# Patient Record
Sex: MALE | Race: BLACK OR AFRICAN AMERICAN | NOT HISPANIC OR LATINO | RURAL
[De-identification: names, ages, dates, MRNs, and addresses within clinical notes are randomized per-mention and may not be internally consistent; named-entity substitution may affect disease eponyms.]

---

## 2018-10-18 ENCOUNTER — OFFICE VISIT (OUTPATIENT)
Dept: FAMILY MEDICINE CLINIC | Facility: CLINIC | Age: 28
End: 2018-10-18

## 2018-10-18 VITALS
HEIGHT: 70 IN | HEART RATE: 75 BPM | OXYGEN SATURATION: 98 % | WEIGHT: 210 LBS | BODY MASS INDEX: 30.06 KG/M2 | TEMPERATURE: 98.1 F

## 2018-10-18 DIAGNOSIS — K08.89 PAIN, DENTAL: Primary | ICD-10-CM

## 2018-10-18 DIAGNOSIS — K04.7 DENTAL ABSCESS: ICD-10-CM

## 2018-10-18 PROCEDURE — 99202 OFFICE O/P NEW SF 15 MIN: CPT | Performed by: NURSE PRACTITIONER

## 2018-10-18 RX ORDER — CHLORHEXIDINE GLUCONATE 0.12 MG/ML
15 RINSE ORAL EVERY 12 HOURS SCHEDULED
Qty: 480 ML | Refills: 0 | Status: SHIPPED | OUTPATIENT
Start: 2018-10-18 | End: 2019-07-10

## 2018-10-18 RX ORDER — HYDROCODONE BITARTRATE AND ACETAMINOPHEN 5; 325 MG/1; MG/1
1 TABLET ORAL EVERY 6 HOURS PRN
Qty: 5 TABLET | Refills: 0 | Status: SHIPPED | OUTPATIENT
Start: 2018-10-18 | End: 2018-10-18 | Stop reason: SDUPTHER

## 2018-10-18 RX ORDER — IBUPROFEN 800 MG/1
800 TABLET ORAL EVERY 8 HOURS PRN
Qty: 30 TABLET | Refills: 1 | Status: SHIPPED | OUTPATIENT
Start: 2018-10-18 | End: 2019-07-10

## 2018-10-18 RX ORDER — HYDROCODONE BITARTRATE AND ACETAMINOPHEN 5; 325 MG/1; MG/1
1 TABLET ORAL EVERY 6 HOURS PRN
Qty: 5 TABLET | Refills: 0 | Status: SHIPPED | OUTPATIENT
Start: 2018-10-18 | End: 2019-07-10

## 2018-10-18 RX ORDER — PENICILLIN V POTASSIUM 500 MG/1
500 TABLET ORAL 4 TIMES DAILY
Qty: 40 TABLET | Refills: 0 | Status: SHIPPED | OUTPATIENT
Start: 2018-10-18 | End: 2019-07-10

## 2018-10-18 NOTE — PATIENT INSTRUCTIONS
Dental Abscess  A dental abscess is a collection of pus in or around a tooth.  What are the causes?  This condition is caused by a bacterial infection around the root of the tooth that involves the inner part of the tooth (pulp). It may result from:  · Severe tooth decay.  · Trauma to the tooth that allows bacteria to enter into the pulp, such as a broken or chipped tooth.  · Severe gum disease around a tooth.    What are the signs or symptoms?  Symptoms of this condition include:  · Severe pain in and around the infected tooth.  · Swelling and redness around the infected tooth, in the mouth, or in the face.  · Tenderness.  · Pus drainage.  · Bad breath.  · Bitter taste in the mouth.  · Difficulty swallowing.  · Difficulty opening the mouth.  · Nausea.  · Vomiting.  · Chills.  · Swollen neck glands.  · Fever.    How is this diagnosed?  This condition is diagnosed with examination of the infected tooth. During the exam, your dentist may tap on the infected tooth. Your dentist will also ask about your medical and dental history and may order X-rays.  How is this treated?  This condition is treated by eliminating the infection. This may be done with:  · Antibiotic medicine.  · A root canal. This may be performed to save the tooth.  · Pulling (extracting) the tooth. This may also involve draining the abscess. This is done if the tooth cannot be saved.    Follow these instructions at home:  · Take medicines only as directed by your dentist.  · If you were prescribed antibiotic medicine, finish all of it even if you start to feel better.  · Rinse your mouth (gargle) often with salt water to relieve pain or swelling.  · Do not drive or operate heavy machinery while taking pain medicine.  · Do not apply heat to the outside of your mouth.  · Keep all follow-up visits as directed by your dentist. This is important.  Contact a health care provider if:  · Your pain is worse and is not helped by medicine.  Get help right away  if:  · You have a fever or chills.  · Your symptoms suddenly get worse.  · You have a very bad headache.  · You have problems breathing or swallowing.  · You have trouble opening your mouth.  · You have swelling in your neck or around your eye.  This information is not intended to replace advice given to you by your health care provider. Make sure you discuss any questions you have with your health care provider.  Document Released: 12/18/2006 Document Revised: 04/27/2017 Document Reviewed: 12/15/2015  [employer] Interactive Patient Education © 2018 [employer]

## 2018-10-18 NOTE — PROGRESS NOTES
"Julián Morales is a 28 y.o. male.     FP Walk in Clinic Visit    PCP: None listed    CC: \"bad toothache x 2 weeks\"    Has had problems with teeth for quite some time and needs several extracted.  Has appointment with oral surgeon, but not until December.  Last treated for abscess at [facility] about 2 months ago and felt well x 4-6 weeks and then it started back again.  Pain is mostly on right side with some swelling.  Difficult to eat/drink.  No fever.        Dental Pain    This is a recurrent problem. Episode onset: newest flare started about 2 weeks ago. The problem occurs constantly. The problem has been gradually worsening. The pain is at a severity of 8/10. Associated symptoms include facial pain and thermal sensitivity. Pertinent negatives include no difficulty swallowing, fever, oral bleeding or sinus pressure. He has tried NSAIDs and acetaminophen for the symptoms. The treatment provided no relief.        The following portions of the patient's history were reviewed and updated as appropriate: allergies, current medications, past medical history, past social history, past surgical history and problem list.    Review of Systems   Constitutional: Negative for appetite change, chills, fatigue and fever.   HENT: Positive for dental problem. Negative for sinus pressure.    Eyes: Negative for discharge and itching.   Respiratory: Negative for cough, chest tightness, shortness of breath and wheezing.    Cardiovascular: Negative for chest pain and leg swelling.   Gastrointestinal: Negative for diarrhea, nausea and vomiting.   Musculoskeletal: Negative for myalgias, neck pain and neck stiffness.   Skin: Negative for rash.   Neurological: Positive for headache. Negative for dizziness.   Hematological: Positive for adenopathy.       Objective    Pulse 75   Temp 98.1 °F (36.7 °C) (Tympanic)   Ht 177.8 cm (70\")   Wt 95.3 kg (210 lb)   SpO2 98%   BMI 30.13 kg/m²     Physical Exam   Constitutional: He is " oriented to person, place, and time. He appears well-developed and well-nourished. Distressed:  in some discomfort, rocking back/forth, holding right side of mouth.   HENT:   Head: Normocephalic and atraumatic.   Mouth/Throat: Mucous membranes are normal. Abnormal dentition. Dental abscesses ( right lower gingiva with erythema, edema and multiple teeth with decay broken down to gum line) and dental caries ( multiple) present. No oropharyngeal exudate or posterior oropharyngeal erythema.   Neck: Neck supple.   Cardiovascular: Normal rate and regular rhythm.    Pulmonary/Chest: Effort normal and breath sounds normal. He has no wheezes. He has no rales.   Lymphadenopathy:     He has cervical adenopathy ( shotty on right).   Neurological: He is alert and oriented to person, place, and time.   Nursing note and vitals reviewed.        Assessment/Plan   Csaimiro was seen today for dental pain.    Diagnoses and all orders for this visit:    Pain, dental  -     lidocaine viscous (XYLOCAINE) 2 % solution; Saturate cotton ball and apply over affected tooth/gum area every 3 hours prn pain  -     ibuprofen (ADVIL,MOTRIN) 800 MG tablet; Take 1 tablet by mouth Every 8 (Eight) Hours As Needed (dental pain).  -     Discontinue: HYDROcodone-acetaminophen (NORCO) 5-325 MG per tablet; Take 1 tablet by mouth Every 6 (Six) Hours As Needed (dental pain).  -     HYDROcodone-acetaminophen (NORCO) 5-325 MG per tablet; Take 1 tablet by mouth Every 6 (Six) Hours As Needed (dental pain).    Dental abscess  -     penicillin v potassium (VEETID) 500 MG tablet; Take 1 tablet by mouth 4 (Four) Times a Day.  -     chlorhexidine (PERIDEX) 0.12 % solution; Apply 15 mL to the mouth or throat Every 12 (Twelve) Hours.  -     lidocaine viscous (XYLOCAINE) 2 % solution; Saturate cotton ball and apply over affected tooth/gum area every 3 hours prn pain  -     ibuprofen (ADVIL,MOTRIN) 800 MG tablet; Take 1 tablet by mouth Every 8 (Eight) Hours As Needed  (dental pain).  -     Discontinue: HYDROcodone-acetaminophen (NORCO) 5-325 MG per tablet; Take 1 tablet by mouth Every 6 (Six) Hours As Needed (dental pain).  -     HYDROcodone-acetaminophen (NORCO) 5-325 MG per tablet; Take 1 tablet by mouth Every 6 (Six) Hours As Needed (dental pain).      Rx for Penicillin, Peridex, Lidocaine viscous, Motrin 800  Rx for Norco #5 only for the next 24 hours until antibiotics start working.  Jeremias #[id number] reviewed and no activity noted.     See oral surgeon as scheduled.  Try to call and check for cancellations to try and be seen sooner if possible.

## 2019-07-10 ENCOUNTER — OFFICE VISIT (OUTPATIENT)
Dept: FAMILY MEDICINE CLINIC | Facility: CLINIC | Age: 29
End: 2019-07-10

## 2019-07-10 VITALS
BODY MASS INDEX: 28.23 KG/M2 | SYSTOLIC BLOOD PRESSURE: 120 MMHG | HEART RATE: 70 BPM | DIASTOLIC BLOOD PRESSURE: 80 MMHG | WEIGHT: 197.2 LBS | HEIGHT: 70 IN | TEMPERATURE: 98.6 F | OXYGEN SATURATION: 99 %

## 2019-07-10 DIAGNOSIS — I83.813 VARICOSE VEINS OF BOTH LOWER EXTREMITIES WITH PAIN: Primary | ICD-10-CM

## 2019-07-10 PROCEDURE — 99213 OFFICE O/P EST LOW 20 MIN: CPT | Performed by: NURSE PRACTITIONER

## 2019-07-10 NOTE — PROGRESS NOTES
"Julián Morales is a 29 y.o. male.     FP Walk in Clinic Visit    PCP: none listed    CC: \"problems with my veins in leg\"      Lower Extremity Issue   This is a chronic (varicose veins) problem. Episode onset: several years. The problem occurs daily. The problem has been gradually worsening. Pertinent negatives include no abdominal pain, anorexia, arthralgias, change in bowel habit, chest pain, chills, congestion, coughing, diaphoresis, fatigue, fever, headaches, joint swelling, myalgias, nausea, neck pain, numbness, rash, sore throat, swollen glands, urinary symptoms, vertigo, visual change, vomiting or weakness. Associated symptoms comments: +aching and burning in legs  . 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 Treatments tried: seen years ago for same and had u/s and wore compression stockings, surgery recommended, but never followed up--unsure who he saw at that time. The treatment provided no relief.        The following portions of the patient's history were reviewed and updated as appropriate: allergies, current medications, past medical history, past social history, past surgical history and problem list.    Review of Systems   Constitutional: Negative for chills, diaphoresis, fatigue and fever.   HENT: Negative for congestion and sore throat.    Respiratory: Negative.  Negative for cough.    Cardiovascular: Positive for leg swelling ( mild). Negative for chest pain and palpitations.        +varicose veins     Gastrointestinal: Negative for abdominal pain, anorexia, change in bowel habit, diarrhea, nausea and vomiting.   Genitourinary: Negative for difficulty urinating.   Musculoskeletal: Negative for arthralgias, joint swelling, myalgias and neck pain.   Skin: Negative for rash.   Neurological: Negative for vertigo, weakness, numbness and headache.     /80   Pulse 70   Temp 98.6 °F (37 °C)   Ht 177.8 cm (70\")   Wt 89.4 kg (197 lb 3.2 oz)   " SpO2 99%   BMI 28.30 kg/m²     Objective   Physical Exam   Constitutional: He is oriented to person, place, and time. He appears well-developed and well-nourished. No distress.   Cardiovascular: Normal rate and regular rhythm.   Pulses:       Dorsalis pedis pulses are 2+ on the right side, and 2+ on the left side.   Multiple engorged varicosities to bilateral lower extremities, some in large clusters, Left>Right   Pulmonary/Chest: Effort normal and breath sounds normal. He has no wheezes. He has no rales.   Neurological: He is alert and oriented to person, place, and time.   Nursing note and vitals reviewed.    No results found for this or any previous visit (from the past 24 hour(s)).  No Images in the past 120 days found..      Assessment/Plan   Diagnoses and all orders for this visit:    Varicose veins of both lower extremities with pain  -     Ambulatory Referral to Cardiothoracic Surgery        Encouraged to resume use of compression stockings  Referral to vascular surgeon for evaluation

## 2019-07-10 NOTE — PATIENT INSTRUCTIONS
Varicose Veins  Varicose veins are veins that have become enlarged, bulged, and twisted. They most often appear in the legs.  What are the causes?  This condition is caused by damage to the valves in the vein. These valves help blood return to your heart. When they are damaged and they stop working properly, blood may flow backward and back up in the veins near the skin, causing the veins to get larger and appear twisted.  The condition can result from any issue that causes blood to back up, like pregnancy, prolonged standing, or obesity.  What increases the risk?  This condition is more likely to develop in people who are:  · On their feet a lot.  · Pregnant.  · Overweight.    What are the signs or symptoms?  Symptoms of this condition include:  · Bulging, twisted, and bluish veins.  · A feeling of heaviness. This may be worse at the end of the day.  · Leg pain. This may be worse at the end of the day.  · Swelling in the leg.  · Changes in skin color over the veins.    How is this diagnosed?  This condition may be diagnosed based on your symptoms, a physical exam, and an ultrasound test.  How is this treated?  Treatment for this condition may involve:  · Avoiding sitting or standing in one position for long periods of time.  · Wearing compression stockings. These stockings help to prevent blood clots and reduce swelling in the legs.  · Raising (elevating) the legs when resting.  · Losing weight.  · Exercising regularly.    If you have persistent symptoms or want to improve the way your varicose veins look, you may choose to have a procedure to close the varicose veins off or to remove them.  Treatments to close off the veins include:  · Sclerotherapy. In this treatment, a solution is injected into a vein to close it off.  · Laser treatment. In this treatment, the vein is heated with a laser to close it off.  · Radiofrequency vein ablation. In this treatment, an electrical current produced by radio waves is used to  close off the vein.    Treatments to remove the veins include:  · Phlebectomy. In this treatment, the veins are removed through small incisions made over the veins.  · Vein ligation and stripping. In this treatment, incisions are made over the veins. The veins are then removed after being tied (ligated) with stitches (sutures).    Follow these instructions at home:  Activity  · Walk as much as possible. Walking increases blood flow. This helps blood return to the heart and takes pressure off your veins. It also increases your cardiovascular strength.  · Follow your health care provider's instructions about exercising.  · Do not stand or sit in one position for a long period of time.  · Do not sit with your legs crossed.  · Rest with your legs raised during the day.  General instructions  · Follow any diet instructions given to you by your health care provider.  · Wear compression stockings as directed by your health care provider. Do not wear other kinds of tight clothing around your legs, pelvis, or waist.  · Elevate your legs at night to above the level of your heart.  · If you get a cut in the skin over the varicose vein and the vein bleeds:  ? Lie down with your leg raised.  ? Apply firm pressure to the cut with a clean cloth until the bleeding stops.  ? Place a bandage (dressing) on the cut.  Contact a health care provider if:  · The skin around your varicose veins starts to break down.  · You have pain, redness, tenderness, or hard swelling over a vein.  · You are uncomfortable because of pain.  · You get a cut in the skin over a varicose vein and it will not stop bleeding.  Summary  · Varicose veins are veins that have become enlarged, bulged, and twisted. They most often appear in the legs.  · This condition is caused by damage to the valves in the vein. These valves help blood return to your heart.  · Treatment for this condition includes frequent movements, wearing compression stockings, losing weight, and  exercising regularly. In some cases, procedures are done to close off or remove the veins.  · Treatment for this condition may include wearing compression stockings, elevating the legs, losing weight, and engaging in regular activity. In some cases, procedures are done to close off or remove the veins.  This information is not intended to replace advice given to you by your health care provider. Make sure you discuss any questions you have with your health care provider.  Document Released: 09/27/2006 Document Revised: 01/10/2018 Document Reviewed: 01/10/2018  [employer] Interactive Patient Education © 2019 [employer]

## 2019-10-25 ENCOUNTER — APPOINTMENT (OUTPATIENT)
Dept: LAB | Facility: HOSPITAL | Age: 29
End: 2019-10-25

## 2019-10-25 ENCOUNTER — OFFICE VISIT (OUTPATIENT)
Dept: FAMILY MEDICINE CLINIC | Facility: CLINIC | Age: 29
End: 2019-10-25

## 2019-10-25 VITALS
HEART RATE: 68 BPM | DIASTOLIC BLOOD PRESSURE: 80 MMHG | RESPIRATION RATE: 20 BRPM | HEIGHT: 70 IN | OXYGEN SATURATION: 98 % | WEIGHT: 208 LBS | TEMPERATURE: 97.7 F | BODY MASS INDEX: 29.78 KG/M2 | SYSTOLIC BLOOD PRESSURE: 138 MMHG

## 2019-10-25 DIAGNOSIS — R42 DIZZINESS: Primary | ICD-10-CM

## 2019-10-25 DIAGNOSIS — Z11.3 SCREENING EXAMINATION FOR STD (SEXUALLY TRANSMITTED DISEASE): ICD-10-CM

## 2019-10-25 DIAGNOSIS — R00.2 INTERMITTENT PALPITATIONS: ICD-10-CM

## 2019-10-25 DIAGNOSIS — I83.813 VARICOSE VEINS OF BOTH LOWER EXTREMITIES WITH PAIN: ICD-10-CM

## 2019-10-25 PROCEDURE — 81015 MICROSCOPIC EXAM OF URINE: CPT | Performed by: NURSE PRACTITIONER

## 2019-10-25 PROCEDURE — 93005 ELECTROCARDIOGRAM TRACING: CPT | Performed by: NURSE PRACTITIONER

## 2019-10-25 PROCEDURE — 87661 TRICHOMONAS VAGINALIS AMPLIF: CPT | Performed by: NURSE PRACTITIONER

## 2019-10-25 PROCEDURE — 99213 OFFICE O/P EST LOW 20 MIN: CPT | Performed by: NURSE PRACTITIONER

## 2019-10-25 PROCEDURE — 84443 ASSAY THYROID STIM HORMONE: CPT | Performed by: NURSE PRACTITIONER

## 2019-10-25 PROCEDURE — 80053 COMPREHEN METABOLIC PANEL: CPT | Performed by: NURSE PRACTITIONER

## 2019-10-25 PROCEDURE — G0432 EIA HIV-1/HIV-2 SCREEN: HCPCS | Performed by: NURSE PRACTITIONER

## 2019-10-25 PROCEDURE — 85025 COMPLETE CBC W/AUTO DIFF WBC: CPT | Performed by: NURSE PRACTITIONER

## 2019-10-25 PROCEDURE — 87491 CHLMYD TRACH DNA AMP PROBE: CPT | Performed by: NURSE PRACTITIONER

## 2019-10-25 PROCEDURE — 80074 ACUTE HEPATITIS PANEL: CPT | Performed by: NURSE PRACTITIONER

## 2019-10-25 PROCEDURE — 93010 ELECTROCARDIOGRAM REPORT: CPT | Performed by: INTERNAL MEDICINE

## 2019-10-25 PROCEDURE — 87591 N.GONORRHOEAE DNA AMP PROB: CPT | Performed by: NURSE PRACTITIONER

## 2019-10-25 PROCEDURE — 86592 SYPHILIS TEST NON-TREP QUAL: CPT | Performed by: NURSE PRACTITIONER

## 2019-10-25 NOTE — PROGRESS NOTES
"Julián Morales is a 29 y.o. male.     FP Walk in Clinic Visit    PCP: none listed    CC: \"STD testing; dizziness\"    Would like STD testing today.  No new partners, but would like to have screening done.     Was seen in July for varicose veins and referred to vascular surgeon, but they were unable to reach him by phone to schedule an appt.  He would like to reschedule this today.       Dizziness   This is a recurrent problem. The current episode started more than 1 month ago. The problem occurs intermittently. The problem has been waxing and waning. Pertinent negatives include no abdominal pain, anorexia, arthralgias, change in bowel habit, chest pain, chills, congestion, coughing, diaphoresis, fatigue, fever, headaches, joint swelling, myalgias, nausea, neck pain, numbness, rash, sore throat, swollen glands, urinary symptoms, vertigo, visual change, vomiting or weakness. Nothing aggravates the symptoms. He has tried nothing for the symptoms.        The following portions of the patient's history were reviewed and updated as appropriate: allergies, current medications, past medical history, past social history, past surgical history and problem list.    Review of Systems   Constitutional: Negative for appetite change, chills, diaphoresis, fatigue, fever, unexpected weight gain and unexpected weight loss.   HENT: Negative.  Negative for congestion, sore throat and swollen glands.    Eyes: Negative.    Respiratory: Negative.  Negative for cough.    Cardiovascular: Positive for palpitations ( at times) and leg swelling (multiple varicose veins). Negative for chest pain.   Gastrointestinal: Positive for diarrhea ( last week, has resolved). Negative for abdominal pain, anorexia, blood in stool, change in bowel habit, nausea and vomiting.   Genitourinary: Negative for difficulty urinating, discharge, penile pain, penile swelling and scrotal swelling.   Musculoskeletal: Negative for arthralgias, joint " "swelling, myalgias and neck pain.   Skin: Negative for rash.   Neurological: Positive for dizziness. Negative for vertigo, weakness, light-headedness, numbness and headache.     /80 (BP Location: Right arm, Patient Position: Sitting, Cuff Size: Adult)   Pulse 68   Temp 97.7 °F (36.5 °C) (Oral)   Resp 20   Ht 177.8 cm (70\")   Wt 94.3 kg (208 lb)   SpO2 98%   BMI 29.84 kg/m²     Objective   Physical Exam   Constitutional: He is oriented to person, place, and time. He appears well-developed and well-nourished. No distress.   HENT:   Head: Normocephalic and atraumatic.   Right Ear: Tympanic membrane and ear canal normal.   Left Ear: Tympanic membrane and ear canal normal.   Nose: Nose normal. Right sinus exhibits no maxillary sinus tenderness and no frontal sinus tenderness. Left sinus exhibits no maxillary sinus tenderness and no frontal sinus tenderness.   Mouth/Throat: Uvula is midline, oropharynx is clear and moist and mucous membranes are normal.   Eyes: Conjunctivae are normal. Right eye exhibits no discharge. Left eye exhibits no discharge.   Neck: Normal range of motion. Neck supple. No thyromegaly present.   Cardiovascular: Normal rate and regular rhythm.   Murmur ( faint while sitting, not noted with lying) heard.  Multiple varicosities to lower legs   Pulmonary/Chest: Effort normal and breath sounds normal. He has no wheezes. He has no rales.   Abdominal: Soft. Bowel sounds are normal. There is no tenderness. There is no rebound and no guarding.   Lymphadenopathy:     He has no cervical adenopathy.   Neurological: He is alert and oriented to person, place, and time.   Nursing note and vitals reviewed.    No results found for this or any previous visit (from the past 24 hour(s)).  No Images in the past 120 days found..    EKG: NSR with rate at 64 bpm.  Official cardiology read pending.     Assessment/Plan   Casimiro was seen today for exposure to std.    Diagnoses and all orders for this " visit:    Dizziness  -     ECG 12 Lead  -     CBC Auto Differential  -     Comprehensive metabolic panel  -     TSH    Screening examination for STD (sexually transmitted disease)  -     Chlamydia trachomatis, Neisseria gonorrhoeae, Trichomonas vaginalis, PCR - Urine, Urine, Clean Catch  -     Urinalysis, Microscopic Only - Urine, Clean Catch  -     HIV-1/O/2 Ag/Ab w Reflex  -     Hepatitis panel, acute  -     RPR    Intermittent palpitations  -     ECG 12 Lead  -     CBC Auto Differential  -     Comprehensive metabolic panel  -     TSH      Labs as above--will call with results when available  Safe sex practices discussed and encouraged    Will try to rescheduled appt with vascular surgery regarding leg varicosities    Encouraged to establish with PCP for ongoing health maintenance

## 2019-10-26 LAB
ALBUMIN SERPL-MCNC: 4 G/DL (ref 3.5–5.2)
ALBUMIN/GLOB SERPL: 1.1 G/DL
ALP SERPL-CCNC: 82 U/L (ref 39–117)
ALT SERPL W P-5'-P-CCNC: 15 U/L (ref 1–41)
ANION GAP SERPL CALCULATED.3IONS-SCNC: 10.1 MMOL/L (ref 5–15)
AST SERPL-CCNC: 17 U/L (ref 1–40)
BACTERIA UR QL AUTO: NORMAL /HPF
BASOPHILS # BLD AUTO: 0.02 10*3/MM3 (ref 0–0.2)
BASOPHILS NFR BLD AUTO: 0.2 % (ref 0–1.5)
BILIRUB SERPL-MCNC: 1.2 MG/DL (ref 0.2–1.2)
BUN BLD-MCNC: 7 MG/DL (ref 6–20)
BUN/CREAT SERPL: 5.8 (ref 7–25)
C TRACH RRNA CVX QL NAA+PROBE: NEGATIVE
CALCIUM SPEC-SCNC: 9.1 MG/DL (ref 8.6–10.5)
CHLORIDE SERPL-SCNC: 107 MMOL/L (ref 98–107)
CO2 SERPL-SCNC: 25.9 MMOL/L (ref 22–29)
CREAT BLD-MCNC: 1.21 MG/DL (ref 0.76–1.27)
DEPRECATED RDW RBC AUTO: 42.2 FL (ref 37–54)
EOSINOPHIL # BLD AUTO: 0.07 10*3/MM3 (ref 0–0.4)
EOSINOPHIL NFR BLD AUTO: 0.7 % (ref 0.3–6.2)
ERYTHROCYTE [DISTWIDTH] IN BLOOD BY AUTOMATED COUNT: 12.4 % (ref 12.3–15.4)
GFR SERPL CREATININE-BSD FRML MDRD: 86 ML/MIN/1.73
GLOBULIN UR ELPH-MCNC: 3.8 GM/DL
GLUCOSE BLD-MCNC: 77 MG/DL (ref 65–99)
HAV IGM SERPL QL IA: NORMAL
HBV CORE IGM SERPL QL IA: NORMAL
HBV SURFACE AG SERPL QL IA: NORMAL
HCT VFR BLD AUTO: 46.3 % (ref 37.5–51)
HCV AB SER DONR QL: NORMAL
HGB BLD-MCNC: 15.6 G/DL (ref 13–17.7)
HIV1+2 AB SER QL: NORMAL
HYALINE CASTS UR QL AUTO: NORMAL /LPF
IMM GRANULOCYTES # BLD AUTO: 0.02 10*3/MM3 (ref 0–0.05)
IMM GRANULOCYTES NFR BLD AUTO: 0.2 % (ref 0–0.5)
LYMPHOCYTES # BLD AUTO: 1.78 10*3/MM3 (ref 0.7–3.1)
LYMPHOCYTES NFR BLD AUTO: 18.9 % (ref 19.6–45.3)
MCH RBC QN AUTO: 31.4 PG (ref 26.6–33)
MCHC RBC AUTO-ENTMCNC: 33.7 G/DL (ref 31.5–35.7)
MCV RBC AUTO: 93.2 FL (ref 79–97)
MONOCYTES # BLD AUTO: 0.74 10*3/MM3 (ref 0.1–0.9)
MONOCYTES NFR BLD AUTO: 7.8 % (ref 5–12)
N GONORRHOEA RRNA SPEC QL NAA+PROBE: NEGATIVE
NEUTROPHILS # BLD AUTO: 6.81 10*3/MM3 (ref 1.7–7)
NEUTROPHILS NFR BLD AUTO: 72.2 % (ref 42.7–76)
NRBC BLD AUTO-RTO: 0 /100 WBC (ref 0–0.2)
PLATELET # BLD AUTO: 332 10*3/MM3 (ref 140–450)
PMV BLD AUTO: 10 FL (ref 6–12)
POTASSIUM BLD-SCNC: 4.1 MMOL/L (ref 3.5–5.2)
PROT SERPL-MCNC: 7.8 G/DL (ref 6–8.5)
RBC # BLD AUTO: 4.97 10*6/MM3 (ref 4.14–5.8)
RBC # UR: NORMAL /HPF
REF LAB TEST METHOD: NORMAL
RPR SER QL: NORMAL
SODIUM BLD-SCNC: 143 MMOL/L (ref 136–145)
SQUAMOUS #/AREA URNS HPF: NORMAL /HPF
TSH SERPL DL<=0.05 MIU/L-ACNC: 0.79 UIU/ML (ref 0.27–4.2)
WBC NRBC COR # BLD: 9.44 10*3/MM3 (ref 3.4–10.8)
WBC UR QL AUTO: NORMAL /HPF